# Patient Record
(demographics unavailable — no encounter records)

---

## 2024-10-31 NOTE — PLAN
[TextEntry] : -- Patient does not have abnormality on exam today but his history is most consistent with anal fissure.  Possibly this was acute and superficial and has since healed. -- Discussed with patient that he should begin a powder fiber supplement (Metamucil, Benefiber, psyllium, etc).  He was also advised to drink at least 8 glasses of water daily in addition. -- Miralax and stool softener may be added as needed -- As he feels well and has normal exam today, will hold off on ordering any compounded nifedipine. -- Follow up as needed - advised to call if any recurrent symptoms

## 2024-10-31 NOTE — PHYSICAL EXAM
[JVD] : no jugular venous distention  [Respiratory Effort] : normal respiratory effort [Normal Rate and Rhythm] : normal rate and rhythm [No Edema] : No edema [No Rash or Lesion] : No rash or lesion [Alert] : alert [Oriented to Person] : oriented to person [Oriented to Place] : oriented to place [Oriented to Time] : oriented to time [Calm] : calm [de-identified] : Soft, nondistended [de-identified] : External exam without fissure, fistula, excoriation, or condyloma.  REZA without masses and tenderness.  Anoscopy normal, no significant enlargement to internal hemorrhoids, no visible masses. [de-identified] : NAD [de-identified] : Normocephalic [de-identified] : Moves all extremities

## 2024-10-31 NOTE — PROCEDURE
[FreeTextEntry1] : After informed consent was obtained, a lubricated lighted anoscope was inserted into the anal canal to evaluate hemorrhoids. The canal was inspected circumferentially up to the level above the dentate line.  The scope was withdrawn. The patient tolerated the procedure well.

## 2025-01-27 NOTE — ASSESSMENT
[FreeTextEntry1] : 82yo M with BPH and chronic bladder outlet obstruction, chronic urinary retention.  - Discussed that his bladder is likely not working well 2/2 chronic bladder outlet obstruction. However his symptoms are not terribly bothersome, he has not had recurrent infections, and his kidney function is within normal limits. I discussed risks of chronic urinary retention include kidney dysfunction, UTIs, bladder stones. I recommended restarting tamsulosin 0.4mg to take in AM to try to minimize PVR. Discussed timed voiding and double voiding as well.  - Discussed need for further work-up and/or intervention is symptoms worsen or he develops any of the above. This would possibly include cystoscopy, urodynamics for work up and possible urinary catheterization or bladder outlet procedure for treatment. - Obtain UA and UCx to rule out infection - Patient would like to defer followup and other workup until after he completes immunotherapy infusions end of March.  Follow-up appointment in 3 months for continued management of BPH, urinary retention

## 2025-01-27 NOTE — PHYSICAL EXAM
[Normal Appearance] : normal appearance [Well Groomed] : well groomed [General Appearance - In No Acute Distress] : no acute distress [Edema] : no peripheral edema [Respiration, Rhythm And Depth] : normal respiratory rhythm and effort [Exaggerated Use Of Accessory Muscles For Inspiration] : no accessory muscle use [Abdomen Soft] : soft [Abdomen Tenderness] : non-tender [Costovertebral Angle Tenderness] : no ~M costovertebral angle tenderness [Urinary Bladder Findings] : the bladder was normal on palpation [Normal Station and Gait] : the gait and station were normal for the patient's age [] : no rash [No Focal Deficits] : no focal deficits [Oriented To Time, Place, And Person] : oriented to person, place, and time [Affect] : the affect was normal [Mood] : the mood was normal [No Palpable Adenopathy] : no palpable adenopathy [de-identified] : mild suprapubic tenderness

## 2025-01-27 NOTE — HISTORY OF PRESENT ILLNESS
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 Patient reports he has had an issue with a full bladder for "over 20 years". He reports urinary frequency throughout the day, weak stream, and nocturia 2-3x a night. When he was in the hospital this past fall he was found to have a very large bladder and had a catheter placed. He had a UTI after the catheter was placed, catheter was removed prior to discharge. Reports one or two UTIs in his life prior to this. He was started on flomax 0.4mg which he started and reported improved urine flow. However he noticed he had mild urinary leakage at night which led him to stop the medication. He does use some abdominal muscles to urinate.  Had a bladder ultrasound done recently that showed post-void residual of over 1L, bladder trabeculations and enlarged prostate. Mild hydronephrosis bilaterally. He is currently getting infusions at [facility] and gets bloodwork weekly. Review of bloodwork shows stable creatinine of 0.8 for last month which is his baseline.  He denies hematuria, kidney stones, suprapubic pain, flank pain. He is only mildly bothered by his urinary symptoms but was told he should see urology.  Review of  radiology shows he had a CT scan in 2019 that showed a distended bladder with trabeculations at that time.   Last PSA 1.2 6/2024  A post-void residual was obtained in office. This was medically necessary to ensure the patient was emptying bladder adequately and was not retaining urine as that could contribute to his symptoms and significantly . PVR = 1200cc

## 2025-04-09 NOTE — ASSESSMENT
[FreeTextEntry1] : 82 yo male with history of Pepcid which is exacerbated with therapy for leukemia.  Patient advised about lifestyle modification including eating slowly and avoiding carbonated beverages.  Patient advised to start taking famotidine in the morning in addition to his nightly pantoprazole.  Follow up in two months and workup if no improvement.

## 2025-04-09 NOTE — PHYSICAL EXAM

## 2025-04-09 NOTE — HISTORY OF PRESENT ILLNESS
[FreeTextEntry1] : Mr. YEYO COBIAN is a 81 year old male with history of dyspepsia.  Patient has a long history of GERD and dyspepsia.  Patient is undergoing therapy for acute lymphocytic leukemia.  During his treatment, he has noted belching and dyspepsia.  There has been no true heartburn or dysphagia.  He is taking pantoprazole before his evening meal.  He cannot take it twice a day due to interactions with other medications.

## 2025-05-20 NOTE — HISTORY OF PRESENT ILLNESS
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 Patient reports he has had an issue with a full bladder for "over 20 years". He reports urinary frequency throughout the day, weak stream, and nocturia 2-3x a night. When he was in the hospital this past fall he was found to have a very large bladder and had a catheter placed. He had a UTI after the catheter was placed, catheter was removed prior to discharge. Reports one or two UTIs in his life prior to this. He was started on flomax 0.4mg which he started and reported improved urine flow. However he noticed he had mild urinary leakage at night which led him to stop the medication. He does use some abdominal muscles to urinate.  Had a bladder ultrasound done recently that showed post-void residual of over 1L, bladder trabeculations and enlarged prostate. Mild hydronephrosis bilaterally. He is currently getting infusions at [facility] and gets bloodwork weekly. Review of bloodwork shows stable creatinine of 0.8 for last month which is his baseline.  He denies hematuria, kidney stones, suprapubic pain, flank pain. He is only mildly bothered by his urinary symptoms but was told he should see urology.  Review of  radiology shows he had a CT scan in 2019 that showed a distended bladder with trabeculations at that time.   Last PSA 1.2 6/2024  A post-void residual was obtained in office. This was medically necessary to ensure the patient was emptying bladder adequately and was not retaining urine as that could contribute to his symptoms and significantly . PVR = 1200cc  05/20/2025: Patient returns for follow-up.  He reports he feels well.  He finished the infusions for his AML.  Reports that his urination is fairly stable.  He has been taking the tamsulosin every morning.  Denies significant side effects.  Has been urinating his normal amount as well as 2-3 times at night.  Feels that his stream is okay.  He gets routine blood work done with his oncologist.  He showed me results from his most recent creatinine levels that should have been tested every week and his creatinine has been between 0.75 and 0.9.  Most recent creatinine done yesterday 5/19 and was 0.85.  He denies any hematuria, urinary infections, dysuria, flank pain.  Urine tests from last visit were negative.  A post-void residual was obtained in office. This was medically necessary to ensure the patient was emptying bladder adequately and was not retaining urine as that could contribute to his symptoms and significantly . PVR = 1148cc

## 2025-05-20 NOTE — ASSESSMENT
[FreeTextEntry1] : 82yo M with BPH and chronic bladder outlet obstruction, chronic urinary retention.  - Discussed that his bladder is likely not working well 2/2 chronic bladder outlet obstruction. However his symptoms are not terribly bothersome, he has not had recurrent infections, and his kidney function is within normal limits. I discussed risks of chronic urinary retention include kidney dysfunction, UTIs, bladder stones. I recommended restarting tamsulosin 0.4mg to take in AM to try to minimize PVR. Discussed timed voiding and double voiding as well.  - Discussed need for further work-up and/or intervention is symptoms worsen or he develops any of the above. This would possibly include cystoscopy, urodynamics for work up. Also discussed given his longstanding retention likely would need to manage with catheterization either flannery or SPT vs CIC. He understands - continue routine checks of kidney function. Patient also told to monitor urine output and return to office if he feels he is not urinating.   Follow-up appointment in 6 months for continued management of BPH, urinary retention

## 2025-07-09 NOTE — ASSESSMENT
[Vaccines Reviewed] : Immunizations reviewed today. Please see immunization details in the vaccine log within the immunization flowsheet.  [FreeTextEntry1] : pt to forward heme records and labs

## 2025-07-09 NOTE — HEALTH RISK ASSESSMENT
[No falls in past year] : Patient reported no falls in the past year [None] : Patient does not have any barriers to medication adherence [Yes] : Reviewed medication list for presence of high-risk medications. [] :  [Fully functional (bathing, dressing, toileting, transferring, walking, feeding)] : Fully functional (bathing, dressing, toileting, transferring, walking, feeding) [Fully functional (using the telephone, shopping, preparing meals, housekeeping, doing laundry, using] : Fully functional and needs no help or supervision to perform IADLs (using the telephone, shopping, preparing meals, housekeeping, doing laundry, using transportation, managing medications and managing finances) [With Patient/Caregiver] : , with patient/caregiver [Reviewed no changes] : Reviewed, no changes [Change in mental status noted] : No change in mental status noted [AdvancecareDate] : 7/25

## 2025-07-09 NOTE — HISTORY OF PRESENT ILLNESS
[FreeTextEntry1] : cpx [de-identified] : being treated at [facility] for AML-transferring to [facility] getting serial spinal chemo and immunotx treatment--in remission meds confirmed biking, golf seeing cardio--Laura